# Patient Record
(demographics unavailable — no encounter records)

---

## 2024-10-22 NOTE — ASSESSMENT
[FreeTextEntry1] : 78 year old man 48 cc prostate, mild-moderate LUTS, incidental finding of left hydroureteronephrosis and right hydroureter on CT performed for abdominal/flank pain.  ml. Patient underwent HoLEP on 6/27/24 at [facility]. Pathology with 32 grams benign prostate tissue. Unsuccessful TOV POD-1, subsequent successful TOV on POD-4. Voiding with strong stream, complete emptying, no hematuria or incontinence.    Left hydronephrosis has resolved on US. - F/U UA , UCx, PSA, CMP -F/u in 12 months for UA, IPSs, PVR, PSA

## 2024-10-22 NOTE — LETTER BODY
[Dear  ___] : Dear  [unfilled], [Courtesy Letter:] : I had the pleasure of seeing your patient, [unfilled], in my office today. [Please see my note below.] : Please see my note below. [Consult Closing:] : Thank you very much for allowing me to participate in the care of this patient.  If you have any questions, please do not hesitate to contact me. [Sincerely,] : Sincerely, [FreeTextEntry3] : Taylor Victoria MD